# Patient Record
Sex: MALE | URBAN - METROPOLITAN AREA
[De-identification: names, ages, dates, MRNs, and addresses within clinical notes are randomized per-mention and may not be internally consistent; named-entity substitution may affect disease eponyms.]

---

## 2022-04-18 ENCOUNTER — APPOINTMENT (RX ONLY)
Dept: URBAN - METROPOLITAN AREA CLINIC 110 | Facility: CLINIC | Age: 27
Setting detail: DERMATOLOGY
End: 2022-04-18

## 2022-04-18 DIAGNOSIS — N62 HYPERTROPHY OF BREAST: ICD-10-CM

## 2022-04-18 DIAGNOSIS — L30.9 DERMATITIS, UNSPECIFIED: ICD-10-CM

## 2022-04-18 PROCEDURE — ? ORDER TESTS

## 2022-04-18 PROCEDURE — ? DIAGNOSIS COMMENT

## 2022-04-18 PROCEDURE — ? PRESCRIPTION

## 2022-04-18 PROCEDURE — ? COUNSELING

## 2022-04-18 PROCEDURE — 99204 OFFICE O/P NEW MOD 45 MIN: CPT

## 2022-04-18 PROCEDURE — ? PRESCRIPTION MEDICATION MANAGEMENT

## 2022-04-18 RX ORDER — TRIAMCINOLONE ACETONIDE 1 MG/G
CREAM TOPICAL BID
Qty: 80 | Refills: 1 | Status: ERX | COMMUNITY
Start: 2022-04-18

## 2022-04-18 RX ADMIN — TRIAMCINOLONE ACETONIDE: 1 CREAM TOPICAL at 00:00

## 2022-04-18 ASSESSMENT — LOCATION DETAILED DESCRIPTION DERM
LOCATION DETAILED: PERIUMBILICAL SKIN
LOCATION DETAILED: RIGHT LATERAL INFERIOR CHEST

## 2022-04-18 ASSESSMENT — LOCATION SIMPLE DESCRIPTION DERM
LOCATION SIMPLE: ABDOMEN
LOCATION SIMPLE: CHEST

## 2022-04-18 ASSESSMENT — LOCATION ZONE DERM: LOCATION ZONE: TRUNK

## 2022-04-18 NOTE — HPI: RASH
What Type Of Note Output Would You Prefer (Optional)?: Bullet Format
Is The Patient Presenting As Previously Scheduled?: Yes
How Severe Is Your Rash?: mild
Is This A New Presentation, Or A Follow-Up?: Rash
Additional History: Not treating

## 2022-04-18 NOTE — PROCEDURE: ORDER TESTS
Billing Type: Third-Party Bill
Performing Laboratory: [id number]
Bill For Surgical Tray: no
Lab Facility: [id number]
Expected Date Of Service: 04/18/2022

## 2022-04-18 NOTE — PROCEDURE: DIAGNOSIS COMMENT
Render Risk Assessment In Note?: no
Detail Level: Simple
Comment: Clinical exam consistent with gynecomastia. \\nSlightly firm enlargement of glandular tissue noted below R nipple. Present on left to a much smaller extent. \\n\\nNo nipple retraction, drainage, constitutional Sx --> no clear clinical or historical signs of breast cancer. Pt has also had ultrasound done by PCP, will try to get copy of report. Clinical exam is not overtly suspicious for malignancy. \\n\\nNo clear med trigger. Weight has been stable, no recent weight gain/loss. \\n\\nPt is not at pubertal age, and due to associated pain, will workup with full panel of labs. \\n\\nWill obtain a copy of ultrasound from [facility]\\n\\nIf US is clearly negative, suspicion for malignancy is low. Counseled patient on benefits of biopsy and we could have patient see breast surgeon, but there are risks to the biopsy as well that have to be weighed. \\n\\nIt is interesting that the pt only has pain on the R side, as gynecomastia is usually bilateral, but often times will begin on one side and the other may develop similarly over time. \\n\\nRecommend labwork to r/o underlying endocrine/neoplastic cause.
Comment: No itch\\nFavor ACD from metals\\nRecommend patient avoid metals (belt kendell, metal buttons) and start treating with TAC0.1% \\nIf not improved, will biopsy

## 2022-05-09 ENCOUNTER — APPOINTMENT (RX ONLY)
Dept: URBAN - METROPOLITAN AREA CLINIC 114 | Facility: CLINIC | Age: 27
Setting detail: DERMATOLOGY
End: 2022-05-09

## 2022-05-09 DIAGNOSIS — N62 HYPERTROPHY OF BREAST: ICD-10-CM

## 2022-05-09 DIAGNOSIS — L30.9 DERMATITIS, UNSPECIFIED: ICD-10-CM | Status: RESOLVING

## 2022-05-09 PROCEDURE — 99213 OFFICE O/P EST LOW 20 MIN: CPT

## 2022-05-09 PROCEDURE — ? DIAGNOSIS COMMENT

## 2022-05-09 PROCEDURE — ? PRESCRIPTION MEDICATION MANAGEMENT

## 2022-05-09 PROCEDURE — ? COUNSELING

## 2022-05-09 ASSESSMENT — LOCATION SIMPLE DESCRIPTION DERM
LOCATION SIMPLE: ABDOMEN
LOCATION SIMPLE: CHEST

## 2022-05-09 ASSESSMENT — LOCATION ZONE DERM: LOCATION ZONE: TRUNK

## 2022-05-09 ASSESSMENT — LOCATION DETAILED DESCRIPTION DERM
LOCATION DETAILED: RIGHT LATERAL INFERIOR CHEST
LOCATION DETAILED: PERIUMBILICAL SKIN

## 2022-05-09 NOTE — PROCEDURE: PRESCRIPTION MEDICATION MANAGEMENT
Render In Strict Bullet Format?: No
Detail Level: Zone
Continue Regimen: triamcinolone acetonide 0.1 % topical cream BID, PRN flare

## 2022-05-09 NOTE — PROCEDURE: DIAGNOSIS COMMENT
Render Risk Assessment In Note?: no
Detail Level: Simple
Comment: Ultrasound negative for breast cancer\\nPt had baseline gynecomastia, just more tender in last few months\\n\\nScreening endocrine labs show low testosterone and high E2, mild anomalies. Recommend evaluation with endocrinologist, will discuss with PCP prior to referral \\n………………………………\\nInitial consultation note: \\n\\nClinical exam consistent with gynecomastia. \\nSlightly firm enlargement of glandular tissue noted below R nipple. Present on left to a much smaller extent. \\n\\nNo nipple retraction, drainage, constitutional Sx --> no clear clinical or historical signs of breast cancer. Pt has also had ultrasound done by PCP, will try to get copy of report. Clinical exam is not overtly suspicious for malignancy. \\n\\nNo clear med trigger. Weight has been stable, no recent weight gain/loss. \\nPt is not at pubertal age, and due to associated pain, will workup with full panel of labs.
Comment: Patient hasn’t worn belts since last seen, and appears almost clear today. Instructed patient to discontinue Triamcinolone and wait to wear any belts for 2 more weeks. Instructed patient to wear a belt after 2 more weeks and if flared again, we have the answer.\\n…………\\nNo itch\\nFavor ACD from metals\\nRecommend patient avoid metals (belt kendell, metal buttons) and start treating with TAC0.1% \\nIf not improved, will biopsy

## 2023-06-20 NOTE — HPI: SKIN LESION
Is This A New Presentation, Or A Follow-Up?: Skin Lesion
What Type Of Note Output Would You Prefer (Optional)?: Bullet Format
Additional History: Patient had X-ray (clear) and ultrasound for cysts.\\n\\nPain when pressure applied
No